# Patient Record
Sex: FEMALE | Race: BLACK OR AFRICAN AMERICAN | NOT HISPANIC OR LATINO | Employment: PART TIME | URBAN - METROPOLITAN AREA
[De-identification: names, ages, dates, MRNs, and addresses within clinical notes are randomized per-mention and may not be internally consistent; named-entity substitution may affect disease eponyms.]

---

## 2019-03-28 ENCOUNTER — HOSPITAL ENCOUNTER (EMERGENCY)
Facility: CLINIC | Age: 29
Discharge: HOME | End: 2019-03-28
Admitting: PHYSICIAN ASSISTANT

## 2019-03-28 ENCOUNTER — APPOINTMENT (OUTPATIENT)
Dept: GENERAL RADIOLOGY | Facility: CLINIC | Age: 29
End: 2019-03-28
Attending: PHYSICIAN ASSISTANT

## 2019-03-28 VITALS
HEIGHT: 65 IN | SYSTOLIC BLOOD PRESSURE: 140 MMHG | OXYGEN SATURATION: 100 % | BODY MASS INDEX: 37.75 KG/M2 | DIASTOLIC BLOOD PRESSURE: 74 MMHG | RESPIRATION RATE: 16 BRPM | TEMPERATURE: 99.2 F | WEIGHT: 226.6 LBS

## 2019-03-28 DIAGNOSIS — R05.9 COUGH: ICD-10-CM

## 2019-03-28 DIAGNOSIS — J45.901 EXACERBATION OF ASTHMA, UNSPECIFIED ASTHMA SEVERITY, UNSPECIFIED WHETHER PERSISTENT: ICD-10-CM

## 2019-03-28 DIAGNOSIS — R06.02 SHORTNESS OF BREATH: ICD-10-CM

## 2019-03-28 DIAGNOSIS — R50.9 FEVER: ICD-10-CM

## 2019-03-28 LAB
ANION GAP SERPL CALCULATED.3IONS-SCNC: 9 MMOL/L (ref 3–14)
BASOPHILS # BLD AUTO: 0 10E9/L (ref 0–0.2)
BASOPHILS NFR BLD AUTO: 0.2 %
BUN SERPL-MCNC: 9 MG/DL (ref 7–30)
CALCIUM SERPL-MCNC: 8.7 MG/DL (ref 8.5–10.1)
CHLORIDE SERPL-SCNC: 105 MMOL/L (ref 94–109)
CO2 SERPL-SCNC: 26 MMOL/L (ref 20–32)
CREAT SERPL-MCNC: 1 MG/DL (ref 0.52–1.04)
D DIMER PPP FEU-MCNC: <0.3 UG/ML FEU (ref 0–0.5)
DIFFERENTIAL METHOD BLD: ABNORMAL
EOSINOPHIL # BLD AUTO: 0.2 10E9/L (ref 0–0.7)
EOSINOPHIL NFR BLD AUTO: 3.3 %
ERYTHROCYTE [DISTWIDTH] IN BLOOD BY AUTOMATED COUNT: 14.1 % (ref 10–15)
GFR SERPL CREATININE-BSD FRML MDRD: 76 ML/MIN/{1.73_M2}
GLUCOSE SERPL-MCNC: 106 MG/DL (ref 70–99)
HCT VFR BLD AUTO: 34.5 % (ref 35–47)
HGB BLD-MCNC: 11 G/DL (ref 11.7–15.7)
IMM GRANULOCYTES # BLD: 0 10E9/L (ref 0–0.4)
IMM GRANULOCYTES NFR BLD: 0.4 %
LYMPHOCYTES # BLD AUTO: 1.5 10E9/L (ref 0.8–5.3)
LYMPHOCYTES NFR BLD AUTO: 27.2 %
MCH RBC QN AUTO: 26.4 PG (ref 26.5–33)
MCHC RBC AUTO-ENTMCNC: 31.9 G/DL (ref 31.5–36.5)
MCV RBC AUTO: 83 FL (ref 78–100)
MONOCYTES # BLD AUTO: 0.6 10E9/L (ref 0–1.3)
MONOCYTES NFR BLD AUTO: 11.5 %
NEUTROPHILS # BLD AUTO: 3.1 10E9/L (ref 1.6–8.3)
NEUTROPHILS NFR BLD AUTO: 57.4 %
NRBC # BLD AUTO: 0 10*3/UL
NRBC BLD AUTO-RTO: 0 /100
PLATELET # BLD AUTO: 315 10E9/L (ref 150–450)
POTASSIUM SERPL-SCNC: 3.5 MMOL/L (ref 3.4–5.3)
RBC # BLD AUTO: 4.17 10E12/L (ref 3.8–5.2)
SODIUM SERPL-SCNC: 140 MMOL/L (ref 133–144)
TROPONIN I SERPL-MCNC: <0.015 UG/L (ref 0–0.04)
WBC # BLD AUTO: 5.5 10E9/L (ref 4–11)

## 2019-03-28 PROCEDURE — 84484 ASSAY OF TROPONIN QUANT: CPT | Performed by: PHYSICIAN ASSISTANT

## 2019-03-28 PROCEDURE — 85025 COMPLETE CBC W/AUTO DIFF WBC: CPT | Performed by: PHYSICIAN ASSISTANT

## 2019-03-28 PROCEDURE — 93005 ELECTROCARDIOGRAM TRACING: CPT

## 2019-03-28 PROCEDURE — 80048 BASIC METABOLIC PNL TOTAL CA: CPT | Performed by: PHYSICIAN ASSISTANT

## 2019-03-28 PROCEDURE — 94640 AIRWAY INHALATION TREATMENT: CPT

## 2019-03-28 PROCEDURE — 25000125 ZZHC RX 250: Performed by: PHYSICIAN ASSISTANT

## 2019-03-28 PROCEDURE — 85379 FIBRIN DEGRADATION QUANT: CPT | Performed by: PHYSICIAN ASSISTANT

## 2019-03-28 PROCEDURE — 71046 X-RAY EXAM CHEST 2 VIEWS: CPT

## 2019-03-28 PROCEDURE — 99285 EMERGENCY DEPT VISIT HI MDM: CPT | Mod: 25

## 2019-03-28 RX ORDER — IPRATROPIUM BROMIDE AND ALBUTEROL SULFATE 2.5; .5 MG/3ML; MG/3ML
3 SOLUTION RESPIRATORY (INHALATION) ONCE
Status: COMPLETED | OUTPATIENT
Start: 2019-03-28 | End: 2019-03-28

## 2019-03-28 RX ORDER — PREDNISONE 20 MG/1
40 TABLET ORAL DAILY
Qty: 6 TABLET | Refills: 0 | Status: SHIPPED | OUTPATIENT
Start: 2019-03-28 | End: 2019-03-31

## 2019-03-28 RX ORDER — ALBUTEROL SULFATE 5 MG/ML
5 SOLUTION RESPIRATORY (INHALATION) EVERY 6 HOURS PRN
COMMUNITY
End: 2022-03-12

## 2019-03-28 RX ADMIN — IPRATROPIUM BROMIDE AND ALBUTEROL SULFATE 3 ML: .5; 2.5 SOLUTION RESPIRATORY (INHALATION) at 18:39

## 2019-03-28 ASSESSMENT — ENCOUNTER SYMPTOMS
RHINORRHEA: 0
FEVER: 1
SHORTNESS OF BREATH: 1
SORE THROAT: 1
COUGH: 1
WHEEZING: 1

## 2019-03-28 ASSESSMENT — MIFFLIN-ST. JEOR: SCORE: 1758.73

## 2019-03-28 NOTE — ED AVS SNAPSHOT
Emergency Department  [address] [place] [address]  Phone:  [phone]  Fax:  [phone]                                    Megha Salgado   MRN: [id number]    Department:   Emergency Department   Date of Visit:  3/28/2019           After Visit Summary Signature Page    I have received my discharge instructions, and my questions have been answered. I have discussed any challenges I see with this plan with the nurse or doctor.    ..........................................................................................................................................  Patient/Patient Representative Signature      ..........................................................................................................................................  Patient Representative Print Name and Relationship to Patient    ..................................................               ................................................  Date                                   Time    ..........................................................................................................................................  Reviewed by Signature/Title    ...................................................              ..............................................  Date                                               Time          22EPIC Rev 08/18

## 2019-03-28 NOTE — ED PROVIDER NOTES
History     Chief Complaint:  Shortness of breath     HPI   Megha Salgado is a 28 year old female with a history of asthma, who presents with her mom to the ED for the evaluation of shortness of breath. The patient reports that for the past two days she has been experiencing shortness of breath, prompting her to the ED. She describes that her breathing feels tight and wheezy. She notes that she is also experiencing a dry cough and sore throat and that yesterday she had a fever. She also notes that she used two albuterol nebulization just before her arrival to the ED and that she had the respiratory flu last week. She states that she was admitted to a hospital in 2004 for an asthma exasperation and that she returned home from a flight from [place] one week ago. The patient denies chest pain, rhinorrhea, and swelling in her legs.    PE/DVT RISK FACTORS:  Sex:    Female  Hormones:   No  Tobacco:   No  Cancer:   No  Travel:   Yes  Surgery:   No  Other immobilization: No  Personal history:  No  Family history:  No     Allergies:  No known drug allergies     Medications:    The patient is not currently taking any prescribed medications.    Past Medical History:    Asthma     Past Surgical History:    History reviewed. No pertinent surgical history.    Family History:    History reviewed. No pertinent family history.     Social History:  Smoking status: Never Smoker  Alcohol use: No   Marital Status:  Single [1]     Review of Systems   Constitutional: Positive for fever.   HENT: Positive for sore throat. Negative for rhinorrhea.    Respiratory: Positive for cough, shortness of breath and wheezing.    Cardiovascular: Negative for chest pain and leg swelling.   All other systems reviewed and are negative.    Physical Exam     Patient Vitals for the past 24 hrs:   BP Temp Temp src Heart Rate Resp SpO2 Height Weight   03/28/19  1821    88       03/28/19 1800 140/74 -- -- -- -- 100 % -- --   03/28/19 1751 139/79 99.2  " F (37.3  C) Oral 109 16 100 % 1.651 m (5' 5\") 102.8 kg (226 lb 9.6 oz)      Physical Exam  General: Well appearing, well nourished. Normal mood and affect.  Skin: Good turgor, no rash, no unusual bruising or prominent lesions.  HEENT: Head: Normocephalic, atraumatic, no visible masses.   Eyes: Conjunctiva clear.  Ears: EACs clear, TMs translucent.   Nose: Mild nasal congestion. Clear rhinorrhea.   Throat/pharynx: Mucous membranes moist, no mucosal lesions.   Neck: Supple, without lymphadenopathy.  Cardiac: Tachycardic rate and regular rhythm, no murmur or gallop.   Lungs: Mild wheezing to the left base.   Abdomen: Abdomen soft, non-tender. No rebound tenderness of guarding. Large habitus.   Musculoskeletal: Normal gait and station. No calf tenderness or swelling.   Neurologic: Oriented x 3. GCS: 15.  Psychiatric: Intact recent and remote memory, judgment and insight, normal mood and affect.      Emergency Department Course   ECG (18:31:53):  Rate 100 bpm. CO interval 158. QRS duration 80. QT/QTc 356/459. P-R-T axes 68 77 55. Normal sinus rhythm. Normal ECG. Interpreted at 1830 by Kinza Snyder MD.     Imaging:  Radiographic findings were communicated with the patient who voiced understanding of the findings.  XR Chest 2 Views  FINDINGS: Lungs clear. No confluent airspace opacity, pleural effusion  or pneumothorax. Normal heart size and pulmonary vascularity. The  osseous structures are unremarkable.  As read by Radiology.     Laboratory:  D dimer quantitative (1823): <0.3  CBC: HGB 11.0 (L), WNL (WBC 5.5, )  BMP: Glucose 106 (H), WNL (Creatinine 1.00)  Troponin (1823): <0.015    Interventions:  1839, Duoneb, 3 mLs, Nebulization    Emergency Department Course:  Past medical records, nursing notes, and vitals reviewed.  1800: I performed an exam of the patient and obtained history, as documented above.    IV inserted and blood drawn.    1905: I rechecked the patient. Explained findings to patient and " mother.    The patient was sent for a chest x ray while in the emergency department, findings above.    1940: I rechecked the patient.  Findings and plan explained to the Patient. Patient discharged home with instructions regarding supportive care, medications, and reasons to return. The importance of close follow-up was reviewed.       Impression & Plan    Medical Decision Making:  Megha Salgado is a 28 year old female with a PMH of asthma who presents for evaluation of shortness of breath and wheezing. Details of the patient's history can be noted in the HPI. Wheezing present on exam, signs and symptoms are consistent with asthma exacerbation.  A broad differential was considered including foreign body, asthma, pneumonia, bronchitis, reactive airway disease, pneumothorax, cardiac equivalent, viral induced wheezing, allergic phenomena, pertussis, etc. due to the patient's recent travel and tachycardia, she was not PERC negative.  D-dimer was completed.  This returned within normal limits.  No other laboratory abnormalities.  ECG within normal limits.  Chest x-ray was obtained to look further for signs of pneumonia given her difficulty breathing, fevers, cough.  This returned within normal limits.  Duo nebulizer was given to her here.  She felt significant improvement in her breathing after this treatment. No indication for hospitalization at this time as the patient has no signs of hypoxia, no marked increase in respiratory rate, no significant retractions. Patient was discharged with oral steroids. She will continue to use her nebulizer treatments as prescribed at home.  They will follow-up with her primary care provider in the next 2-3 days.  They will return if increased wheezing, progressive shortness of breath, fever > 102, new concerns.  All questions were answered prior to the patient's discharge. They are in agreement with the plan stated above.     Diagnosis:    ICD-10-CM    1. Exacerbation of  asthma, unspecified asthma severity, unspecified whether persistent J45.901 Troponin I   2. Cough R05    3. Fever R50.9    4. Shortness of breath R06.02        Disposition:  discharged to home    Discharge Medications:     Medication List      Started    predniSONE 20 MG tablet  Commonly known as:  DELTASONE  40 mg, Oral, DAILY          Scribe Disclosure:  I, Jay Reinaldo, am serving as a scribe at 5:55 PM on 3/28/2019 to document services personally performed by Eugenia Flanagan PA-C based on my observations and the provider's statements to me.      Jay Najera  3/28/2019    EMERGENCY DEPARTMENT    This was created at least in part with a voice recognition software. Mistakes/typos may be present.      Eugenia Flanagan PA  03/28/19 2120

## 2019-03-29 LAB — INTERPRETATION ECG - MUSE: NORMAL

## 2019-03-29 NOTE — DISCHARGE INSTRUCTIONS
Use your nebulizers as needed.  Take the steroid for the next 3 days.  Follow-up with the clinic listed above next week for recheck.  Return for any changing or worsening symptoms.      Discharge Instructions  Asthma    Asthma is a condition causing narrowing and inflammation of the airways that can make it hard to breathe.  Asthma can also cause cough, wheezing, noisy breathing and tightness in the chest.  Asthma can be brought on or ?triggered? by many things, including dust, mold, pollen, cigarette smoke, exercise, stress and infections, like the common cold.     Return to the Emergency Department if:  Your breathing gets worse.  You need to use your inhaler more often than every 4 hours, or can?t get relief from your inhaler.  You are very weak, or feel much more ill.  You develop new symptoms, such as chest pain.  You cough up blood.  You are vomiting enough that you can?t keep fluids or your medicine down.    What can I do to help myself?  Fill any prescriptions the doctor gave you and take them right away--especially antibiotics. Be sure to finish the whole antibiotic prescription.  You may be given a prescription for an inhaler, which can help loosen tight air passages.  Use this as needed, but not more often than directed. Inhalers work much better when used with a spacer.   You may be given a prescription for a steroid to reduce inflammation. Used long-term, these can have many serious side effects, but for short courses these do not happen. You may notice restlessness or increased appetite.      You may use non-prescription cough or cold medicines. Cough medicines may help, but don?t make the cough go away completely.   Avoid smoke, because this can make your symptoms worse. If you smoke, this may be a good time to quit! Consider using nicotine lozenges, gum, or patches to reduce cravings.   If you have a fever, Tylenol  (acetaminophen), Motrin  (ibuprofen), or Advil  (ibuprofen), may help bring fever  down and may help you feel more comfortable. Be sure to read and follow the package directions, and ask your doctor if you have questions.  Be sure to get your flu shot each year.  The pneumonia shot can help prevent pneumonia.  It is important that you follow up with your regular doctor, to be sure that you are improving from this spell (an acute asthma exacerbation), and also to do what you can to keep from having trouble again. Sometimes you need long-term medicines to keep your asthma under control.   If you were given a prescription for medicine here today, be sure to read all of the information (including the package insert) that comes with your prescription.  This will include important information about the medicine, its side effects, and any warnings that you need to know about.  The pharmacist who fills the prescription can provide more information and answer questions you may have about the medicine.  If you have questions or concerns that the pharmacist cannot address, please call or return to the Emergency Department.   Opioid Medication Information    Pain medications are among the most commonly prescribed medicines, so we are including this information for all our patients. If you did not receive pain medication or get a prescription for pain medicine, you can ignore it.     You may have been given a prescription for an opioid (narcotic) pain medicine and/or have received a pain medicine while here in the Emergency Department. These medicines can make you drowsy or impaired. You must not drive, operate dangerous equipment, or engage in any other dangerous activities while taking these medications. If you drive while taking these medications, you could be arrested for DUI, or driving under the influence. Do not drink any alcohol while you are taking these medications.     Opioid pain medications can cause addiction. If you have a history of chemical dependency of any type, you are at a higher risk of  becoming addicted to pain medications.  Only take these prescribed medications to treat your pain when all other options have been tried. Take it for as short a time and as few doses as possible. Store your pain pills in a secure place, as they are frequently stolen and provide a dangerous opportunity for children or visitors in your house to start abusing these powerful medications. We will not replace any lost or stolen medicine.  As soon as your pain is better, you should flush all your remaining medication.     Many prescription pain medications contain Tylenol  (acetaminophen), including Vicodin , Tylenol #3 , Norco , Lortab , and Percocet .  You should not take any extra pills of Tylenol  if you are using these prescription medications or you can get very sick.  Do not ever take more than 3000 mg of acetaminophen in any 24 hour period.    All opioids tend to cause constipation. Drink plenty of water and eat foods that have a lot of fiber, such as fruits, vegetables, prune juice, apple juice and high fiber cereal.  Take a laxative if you don?t move your bowels at least every other day. Miralax , Milk of Magnesia, Colace , or Senna  can be used to keep you regular.      Remember that you can always come back to the Emergency Department if you are not able to see your regular doctor in the amount of time listed above, if you get any new symptoms, or if there is anything that worries you.

## 2019-05-04 ENCOUNTER — HOSPITAL ENCOUNTER (EMERGENCY)
Facility: CLINIC | Age: 29
Discharge: HOME | End: 2019-05-04
Attending: EMERGENCY MEDICINE | Admitting: EMERGENCY MEDICINE
Payer: COMMERCIAL

## 2019-05-04 VITALS
HEART RATE: 90 BPM | DIASTOLIC BLOOD PRESSURE: 68 MMHG | SYSTOLIC BLOOD PRESSURE: 135 MMHG | OXYGEN SATURATION: 98 % | TEMPERATURE: 99 F | WEIGHT: 220 LBS | RESPIRATION RATE: 16 BRPM | HEIGHT: 66 IN | BODY MASS INDEX: 35.36 KG/M2

## 2019-05-04 DIAGNOSIS — S20.211A CHEST WALL CONTUSION, RIGHT, INITIAL ENCOUNTER: ICD-10-CM
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
## 2019-05-04 PROCEDURE — 25000132 ZZH RX MED GY IP 250 OP 250 PS 637: Performed by: EMERGENCY MEDICINE

## 2019-05-04 PROCEDURE — 99283 EMERGENCY DEPT VISIT LOW MDM: CPT

## 2019-05-04 RX ORDER — IBUPROFEN 600 MG/1
600 TABLET, FILM COATED ORAL ONCE
Status: COMPLETED | OUTPATIENT
Start: 2019-05-04 | End: 2019-05-04

## 2019-05-04 RX ADMIN — IBUPROFEN 600 MG: 600 TABLET ORAL at 15:22

## 2019-05-04 ASSESSMENT — ENCOUNTER SYMPTOMS
HEADACHES: 1
NUMBNESS: 0
SHORTNESS OF BREATH: 0
ABDOMINAL PAIN: 0
BACK PAIN: 0
WEAKNESS: 0
NECK PAIN: 0

## 2019-05-04 ASSESSMENT — MIFFLIN-ST. JEOR: SCORE: 1739.66
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
## 2019-05-04 NOTE — ED NOTES
Patient up to desk inquiring if a room is available for her. Patient updated on delay. Patient went back to visit mom in ED.

## 2019-05-04 NOTE — ED AVS SNAPSHOT
Emergency Department  [address] [place] [address]  Phone:  [phone]  Fax:  [phone]                                    Megha Salgado   MRN: [id number]    Department:   Emergency Department   Date of Visit:  5/4/2019           After Visit Summary Signature Page    I have received my discharge instructions, and my questions have been answered. I have discussed any challenges I see with this plan with the nurse or doctor.    ..........................................................................................................................................  Patient/Patient Representative Signature      ..........................................................................................................................................  Patient Representative Print Name and Relationship to Patient    ..................................................               ................................................  Date                                   Time    ..........................................................................................................................................  Reviewed by Signature/Title    ...................................................              ..............................................  Date                                               Time          22EPIC Rev 08/18

## 2020-12-01 ENCOUNTER — HOSPITAL ENCOUNTER (EMERGENCY)
Facility: CLINIC | Age: 30
Discharge: HOME | End: 2020-12-01
Attending: NURSE PRACTITIONER | Admitting: NURSE PRACTITIONER
Payer: COMMERCIAL

## 2020-12-01 VITALS
SYSTOLIC BLOOD PRESSURE: 128 MMHG | WEIGHT: 220 LBS | BODY MASS INDEX: 36.65 KG/M2 | TEMPERATURE: 98.9 F | HEIGHT: 65 IN | OXYGEN SATURATION: 100 % | RESPIRATION RATE: 18 BRPM | DIASTOLIC BLOOD PRESSURE: 63 MMHG | HEART RATE: 85 BPM

## 2020-12-01 DIAGNOSIS — M54.10 RADICULAR LOW BACK PAIN: ICD-10-CM

## 2020-12-01 DIAGNOSIS — M62.830 BACK MUSCLE SPASM: ICD-10-CM

## 2020-12-01 PROCEDURE — 99282 EMERGENCY DEPT VISIT SF MDM: CPT

## 2020-12-01 RX ORDER — METHOCARBAMOL 500 MG/1
TABLET, FILM COATED ORAL
Qty: 24 TABLET | Refills: 0 | Status: SHIPPED | OUTPATIENT
Start: 2020-12-01

## 2020-12-01 RX ORDER — METHOCARBAMOL 500 MG/1
TABLET, FILM COATED ORAL
Qty: 24 TABLET | Refills: 0 | Status: SHIPPED | OUTPATIENT
Start: 2020-12-01 | End: 2020-12-01

## 2020-12-01 ASSESSMENT — ENCOUNTER SYMPTOMS
WEAKNESS: 1
BACK PAIN: 1

## 2020-12-01 ASSESSMENT — MIFFLIN-ST. JEOR: SCORE: 1718.79

## 2020-12-01 NOTE — ED PROVIDER NOTES
Emergency Department Attending Supervision Note  12/1/2020  2:27 PM      I evaluated this patient in conjunction with Megha WESLEY    Briefly, the patient presented with       On my exam:  Nursing notes reviewed. Vitals reviewed.   General: Alert.  No obvious discomfort . Well kept. Obese  HENT: Normal voice.   Neck: non-tender. Full ROM, no bony deformity, step-off, or crepitus. No obvious paracervical muscle spasm.  Eyes: Sclera and conjunctiva normal  Pulmonary: Normal respiratory effort. No cough.    Musculoskeletal: Normal gross range of motion of all 4 extremities. No spinal tenderness, deformity, step-off, or crepitus. Mild  bilateral  paravertebral muscle spasm, Normal leg strength against resistance bilaterally without triggering discomfort  Neurological: Alert. Normal speech. Responds appropriately. Normal sensation and strength distally.  Skin: Warm and dry. Normal appearance of visualized exposed skin.  Psych: Affect normal. Normal personal interaction. Good eye contact.      Diagnosis    ICD-10-CM    1. Radicular low back pain  M54.10    2. Back muscle spasm  M62.830          Nicola Esparza APRN CNP 12/1/2020 2:27 PM     Nicola Esparza APRN CNP  12/01/20 1503
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
## 2020-12-01 NOTE — ED AVS SNAPSHOT
[facility]  [address] [facility] [address] [facility] [address]  Phone: [phone]  Fax: [phone]                                    Megha Salgado   MRN: [id number]    Department: [facility]   Date of Visit: 12/1/2020           After Visit Summary Signature Page    I have received my discharge instructions, and my questions have been answered. I have discussed any challenges I see with this plan with the nurse or doctor.    ..........................................................................................................................................  Patient/Patient Representative Signature      ..........................................................................................................................................  Patient Representative Print Name and Relationship to Patient    ..................................................               ................................................  Date                                   Time    ..........................................................................................................................................  Reviewed by Signature/Title    ...................................................              ..............................................  Date                                               Time          22EPIC Rev 08/18

## 2021-03-26 ENCOUNTER — OFFICE VISIT (OUTPATIENT)
Dept: URGENT CARE | Facility: URGENT CARE | Age: 31
End: 2021-03-26
Payer: COMMERCIAL

## 2021-03-26 VITALS
HEART RATE: 65 BPM | TEMPERATURE: 99.2 F | SYSTOLIC BLOOD PRESSURE: 130 MMHG | WEIGHT: 217.2 LBS | RESPIRATION RATE: 20 BRPM | OXYGEN SATURATION: 100 % | BODY MASS INDEX: 36.14 KG/M2 | DIASTOLIC BLOOD PRESSURE: 88 MMHG

## 2021-03-26 DIAGNOSIS — N93.9 VAGINAL BLEEDING PROBLEMS: ICD-10-CM

## 2021-03-26 DIAGNOSIS — N76.0 BACTERIAL VAGINOSIS: Primary | ICD-10-CM

## 2021-03-26 DIAGNOSIS — R30.0 DYSURIA: ICD-10-CM

## 2021-03-26 DIAGNOSIS — B96.89 BACTERIAL VAGINOSIS: Primary | ICD-10-CM

## 2021-03-26 LAB
ALBUMIN UR-MCNC: NEGATIVE MG/DL
APPEARANCE UR: CLEAR
BACTERIA #/AREA URNS HPF: ABNORMAL /HPF
BILIRUB UR QL STRIP: NEGATIVE
COLOR UR AUTO: YELLOW
GLUCOSE UR STRIP-MCNC: NEGATIVE MG/DL
HGB UR QL STRIP: ABNORMAL
KETONES UR STRIP-MCNC: NEGATIVE MG/DL
LEUKOCYTE ESTERASE UR QL STRIP: NEGATIVE
NITRATE UR QL: NEGATIVE
NON-SQ EPI CELLS #/AREA URNS LPF: ABNORMAL /LPF
PH UR STRIP: 6.5 PH (ref 5–7)
RBC #/AREA URNS AUTO: ABNORMAL /HPF
SOURCE: ABNORMAL
SP GR UR STRIP: 1.01 (ref 1–1.03)
SPECIMEN SOURCE: ABNORMAL
UROBILINOGEN UR STRIP-ACNC: 0.2 EU/DL (ref 0.2–1)
WBC #/AREA URNS AUTO: ABNORMAL /HPF
WET PREP SPEC: ABNORMAL

## 2021-03-26 PROCEDURE — 99203 OFFICE O/P NEW LOW 30 MIN: CPT | Performed by: PHYSICIAN ASSISTANT

## 2021-03-26 PROCEDURE — 81001 URINALYSIS AUTO W/SCOPE: CPT | Performed by: PHYSICIAN ASSISTANT

## 2021-03-26 PROCEDURE — 87591 N.GONORRHOEAE DNA AMP PROB: CPT | Performed by: PHYSICIAN ASSISTANT

## 2021-03-26 PROCEDURE — 87491 CHLMYD TRACH DNA AMP PROBE: CPT | Performed by: PHYSICIAN ASSISTANT

## 2021-03-26 PROCEDURE — 87086 URINE CULTURE/COLONY COUNT: CPT | Performed by: PHYSICIAN ASSISTANT

## 2021-03-26 PROCEDURE — 87210 SMEAR WET MOUNT SALINE/INK: CPT | Performed by: PHYSICIAN ASSISTANT

## 2021-03-26 RX ORDER — METRONIDAZOLE 500 MG/1
500 TABLET ORAL 2 TIMES DAILY
Qty: 14 TABLET | Refills: 0 | Status: SHIPPED | OUTPATIENT
Start: 2021-03-26 | End: 2021-04-02

## 2021-03-26 RX ORDER — TOPIRAMATE 25 MG/1
TABLET, FILM COATED ORAL
COMMUNITY
Start: 2020-10-17 | End: 2022-03-12

## 2021-03-27 NOTE — PROGRESS NOTES
Patient presents with:  Vaginal Problem: Vaginal bleeding  x 1 week, vaginal burning sensation with urination x for a day about 1 week ago, sharp pain radiating from vaginal area to abdominal area on Lt  side on and off x about 1 week.     (N76.0,  B96.89) Bacterial vaginosis  (primary encounter diagnosis)  Comment:   Plan: metroNIDAZOLE (FLAGYL) 500 MG tablet            (R30.0) Dysuria  Comment:   Plan: UA with Microscopic reflex to Culture, Urine         Culture Aerobic Bacterial, NEISSERIA GONORRHOEA        PCR, CHLAMYDIA TRACHOMATIS PCR            (N93.9) Vaginal bleeding problems  Comment:   Plan: Wet prep, NEISSERIA GONORRHOEA PCR, CHLAMYDIA         TRACHOMATIS PCR            Follow up should symptoms persist or worsen.      Urine culture, GC and CHL labs pending.            SUBJECTIVE:   Megha Salgado is a 30 year old female who presents today with vaginal discharge, with small amount of bleeding, and dysuria, onset one week ago.     Sexually active with stable partner.    Some left sided lower abdominal pain.      LMP was 2 weeks ago.           Current Outpatient Medications   Medication Sig Dispense Refill     Multiple Vitamins-Iron (DAILY-ALEXIS/IRON/BETA-CAROTENE) TABS TAKE 1 TABLET BY MOUTH DAILY. (Patient not taking: Reported on 10/19/2020) 30 tablet 7     Social History     Tobacco Use     Smoking status: Never Smoker     Smokeless tobacco: Never Used   Substance Use Topics     Alcohol use: Not on file     Family History   Problem Relation Age of Onset     Diabetes Mother      Diabetes Father          ROS:    10 point ROS of systems including Constitutional, Eyes, Respiratory, Cardiovascular, Gastroenterology, Genitourinary, Integumentary, Muscularskeletal, Psychiatric ,neurological were all negative except for pertinent positives noted in my HPI       OBJECTIVE:  /88   Pulse 65   Temp 99.2  F (37.3  C) (Tympanic)   Resp 20   Wt 98.5 kg (217 lb 3.2 oz)   SpO2 100%   BMI 36.14 kg/m    Physical  Exam:  GENERAL APPEARANCE: healthy, alert and no distress  ABDOMEN:  soft, nontender, no HSM or masses and bowel sounds normal  GU_female: deferred, self wet prep obtained.     SKIN: no suspicious lesions or rashes    Results for orders placed or performed in visit on 03/26/21   UA with Microscopic reflex to Culture     Status: Abnormal (In process)    Specimen: Midstream Urine   Result Value Ref Range    Color Urine Yellow     Appearance Urine Clear     Glucose Urine Negative NEG^Negative mg/dL    Bilirubin Urine Negative NEG^Negative    Ketones Urine Negative NEG^Negative mg/dL    Specific Gravity Urine 1.010 1.003 - 1.035    pH Urine 6.5 5.0 - 7.0 pH    Protein Albumin Urine Negative NEG^Negative mg/dL    Urobilinogen Urine 0.2 0.2 - 1.0 EU/dL    Nitrite Urine Negative NEG^Negative    Blood Urine Small (A) NEG^Negative    Leukocyte Esterase Urine Negative NEG^Negative    Source Midstream Urine     WBC Urine PENDING OTO5^0 - 5 /HPF    RBC Urine 2-5 (A) OTO2^O - 2 /HPF    Squamous Epithelial /LPF Urine Few FEW^Few /LPF    Bacteria Urine Few (A) NEG^Negative /HPF   Wet prep     Status: Abnormal    Specimen: Vagina   Result Value Ref Range    Specimen Description Vagina     Wet Prep No Trichomonas seen     Wet Prep Moderate  Clue cells seen   (A)     Wet Prep No yeast seen     Wet Prep Few  WBC'S seen

## 2021-03-27 NOTE — PATIENT INSTRUCTIONS
Patient Education     Bacterial Vaginosis    You have a vaginal infection called bacterial vaginosis (BV). Both good and bad bacteria are present in a healthy vagina. BV occurs when these bacteria get out of balance. The number of bad bacteria increase. And the number of good bacteria decrease. BV is linked with sexual activity, but it's not a sexually transmitted infection (STI).   BV may or may not cause symptoms. If symptoms do occur, they can include:     Thin, gray, milky-white, or sometimes green discharge    Unpleasant odor or  fishy  smell    Itching, burning, or pain in or around the vagina  It is not known what causes BV, but certain factors can make the problem more likely. These can include:     Douching    Spermicides    Use of antibiotics    Change in hormone levels with pregnancy, breastfeeding, or menopause    Having sex with a new partner    Having sex with more than one partner  BV will sometimes go away on its own. But treatment is often advised. This is because untreated BV can raise the risk of more serious health problems such as:     Pelvic inflammatory disease (PID)     delivery (giving birth to a baby early if you re pregnant)    HIV and some other sexually transmitted infections (STIs)    Infection after surgery on the reproductive organs  Home care  General care    BV is most often treated with medicines called antibiotics. These may be given as pills or as a vaginal cream. If antibiotics are prescribed, be sure to use them exactly as directed. And complete all of the medicine, even if your symptoms go away.    Don't douche or having sex during treatment.    If you have sex with a female partner, ask your healthcare provider if she should also be treated.  Prevention    Don't douche.    Don't have sex. If you do have sex, then take steps to lower your risk:  ? Use condoms when having sex.  ? Limit the number of sex partners you have.    Follow-up care  Follow up with your  healthcare provider, or as advised.   When to get medical advice  Call your healthcare provider right away if:     You have a fever of 100.4 F (38 C) or higher, or as directed by your provider.    Your symptoms get worse, or they don t go away within a few days of starting treatment.    You have new pain in the lower belly or pelvic region.    You have side effects that bother you or a reaction to the pills or cream you re prescribed.    You or any of your sex partners have new symptoms, such as a rash, joint pain, or sores.  [employer] last reviewed this educational content on 6/1/2020 2000-2020 [employer]. All rights reserved. This information is not intended as a substitute for professional medical care. Always follow your healthcare professional's instructions.

## 2021-03-28 LAB
BACTERIA SPEC CULT: NO GROWTH
C TRACH DNA SPEC QL NAA+PROBE: NEGATIVE
Lab: NORMAL
N GONORRHOEA DNA SPEC QL NAA+PROBE: NEGATIVE
SPECIMEN SOURCE: NORMAL

## 2022-03-12 ENCOUNTER — OFFICE VISIT (OUTPATIENT)
Dept: URGENT CARE | Facility: URGENT CARE | Age: 32
End: 2022-03-12
Payer: COMMERCIAL

## 2022-03-12 VITALS
OXYGEN SATURATION: 100 % | HEART RATE: 101 BPM | RESPIRATION RATE: 20 BRPM | WEIGHT: 217 LBS | DIASTOLIC BLOOD PRESSURE: 82 MMHG | TEMPERATURE: 99.9 F | BODY MASS INDEX: 36.11 KG/M2 | SYSTOLIC BLOOD PRESSURE: 124 MMHG

## 2022-03-12 DIAGNOSIS — Z20.822 SUSPECTED COVID-19 VIRUS INFECTION: Primary | ICD-10-CM

## 2022-03-12 DIAGNOSIS — J06.9 VIRAL URI WITH COUGH: ICD-10-CM

## 2022-03-12 DIAGNOSIS — E66.01 MORBID OBESITY (H): ICD-10-CM

## 2022-03-12 DIAGNOSIS — J45.21 MILD INTERMITTENT ASTHMA WITH EXACERBATION: ICD-10-CM

## 2022-03-12 PROBLEM — J45.990 ASTHMA, EXERCISE INDUCED: Status: ACTIVE | Noted: 2020-07-23

## 2022-03-12 PROBLEM — R87.610 ATYPICAL SQUAMOUS CELLS OF UNDETERMINED SIGNIFICANCE ON CYTOLOGIC SMEAR OF CERVIX (ASC-US): Status: ACTIVE | Noted: 2020-08-06

## 2022-03-12 PROBLEM — R73.03 PREDIABETES: Status: ACTIVE | Noted: 2020-07-26

## 2022-03-12 PROBLEM — E66.812 OBESITY, CLASS II, BMI 35-39.9: Status: ACTIVE | Noted: 2020-07-23

## 2022-03-12 PROBLEM — G43.109 MIGRAINE WITH AURA AND WITHOUT STATUS MIGRAINOSUS, NOT INTRACTABLE: Status: ACTIVE | Noted: 2020-07-23

## 2022-03-12 LAB — SARS-COV-2 RNA RESP QL NAA+PROBE: NEGATIVE

## 2022-03-12 PROCEDURE — 99214 OFFICE O/P EST MOD 30 MIN: CPT | Mod: CS | Performed by: NURSE PRACTITIONER

## 2022-03-12 PROCEDURE — U0005 INFEC AGEN DETEC AMPLI PROBE: HCPCS | Performed by: NURSE PRACTITIONER

## 2022-03-12 PROCEDURE — U0003 INFECTIOUS AGENT DETECTION BY NUCLEIC ACID (DNA OR RNA); SEVERE ACUTE RESPIRATORY SYNDROME CORONAVIRUS 2 (SARS-COV-2) (CORONAVIRUS DISEASE [COVID-19]), AMPLIFIED PROBE TECHNIQUE, MAKING USE OF HIGH THROUGHPUT TECHNOLOGIES AS DESCRIBED BY CMS-2020-01-R: HCPCS | Performed by: NURSE PRACTITIONER

## 2022-03-12 RX ORDER — ALBUTEROL SULFATE 5 MG/ML
2.5 SOLUTION RESPIRATORY (INHALATION) EVERY 4 HOURS PRN
Qty: 360 ML | Refills: 0 | Status: SHIPPED | OUTPATIENT
Start: 2022-03-12 | End: 2022-04-11

## 2022-03-12 RX ORDER — TOPIRAMATE 100 MG/1
100 TABLET, FILM COATED ORAL 2 TIMES DAILY
COMMUNITY
Start: 2021-06-28

## 2022-03-12 RX ORDER — PREDNISONE 20 MG/1
40 TABLET ORAL DAILY
Qty: 10 TABLET | Refills: 0 | Status: SHIPPED | OUTPATIENT
Start: 2022-03-12 | End: 2022-03-17

## 2022-03-12 ASSESSMENT — ASTHMA QUESTIONNAIRES
ACT_TOTALSCORE: 12
ACUTE_EXACERBATION_TODAY: NO
QUESTION_1 LAST FOUR WEEKS HOW MUCH OF THE TIME DID YOUR ASTHMA KEEP YOU FROM GETTING AS MUCH DONE AT WORK, SCHOOL OR AT HOME: A LITTLE OF THE TIME
QUESTION_4 LAST FOUR WEEKS HOW OFTEN HAVE YOU USED YOUR RESCUE INHALER OR NEBULIZER MEDICATION (SUCH AS ALBUTEROL): THREE OR MORE TIMES PER DAY
QUESTION_3 LAST FOUR WEEKS HOW OFTEN DID YOUR ASTHMA SYMPTOMS (WHEEZING, COUGHING, SHORTNESS OF BREATH, CHEST TIGHTNESS OR PAIN) WAKE YOU UP AT NIGHT OR EARLIER THAN USUAL IN THE MORNING: ONCE OR TWICE
ACT_TOTALSCORE: 12
QUESTION_2 LAST FOUR WEEKS HOW OFTEN HAVE YOU HAD SHORTNESS OF BREATH: ONCE A DAY
QUESTION_5 LAST FOUR WEEKS HOW WOULD YOU RATE YOUR ASTHMA CONTROL: NOT CONTROLLED AT ALL

## 2022-03-12 NOTE — PATIENT INSTRUCTIONS
Patient Education     Asthma  What is asthma?  Asthma is a long-term (chronic) ?lung disease. The airways react to triggers (allergens and irritants). This makes it hard to breathe. With exposure to triggers, changes can occur such as:     The airways become swollen and inflamed.    The muscles around the airways tighten.    More mucus is made. This leads to mucus plugs.  All of these changes make the airways narrow. This makes it hard for air to go out of the lungs. And fresh oxygen can't get into the body.   What causes asthma?  Experts don't know the exact cause of asthma. They believe it is partly inherited. The environment, infections, and chemicals released by the body also play a role.   Exercise causes symptoms in many people with asthma. Symptoms can occur during exercise. They can also occur right after exercise. In some people, stress or strong feelings can cause symptoms.   All of these may be asthma triggers:   Allergens Respiratory problem         Pollens (trees, grasses, and weeds)    Mold    Pets    Dust and dust mites    Cockroaches    Mice       Nasal allergies    Sinus infections    The flu    Viral infections, including the common cold   Irritants Medicines         Strong odors from perfumes, , cooking, paints, and varnishes    Chemicals (gases, fumes)    Air pollution    Changing weather (temperature, barometric pressure, humidity, and strong winds)    Smoke (tobacco-inhaled or secondhand)       Aspirin    NSAIDs (nonsteroidal anti-inflammatory drugs) such as ibuprofen   Other conditions Other         GERD (gastroesophageal reflux)    Sleep apnea    Overweight    Depression       Exercise, especially in cold weather    Strong feelings that go along with laughing or crying       Who is at risk for asthma?  It is most common in:     Children and teens ages 5 to17    People living in cities  Other factors include:    Personal or family history of asthma or allergies    Exposure to  secondhand tobacco smoke    Children with a family history of asthma    Children who have allergies or atopic dermatitis    Children exposed to secondhand and tobacco smoke    Living in areas with high levels of environmental air pollution  What are the symptoms of asthma?  Symptoms include:    Trouble breathing or shortness of breath    Chest tightness    Wheezing or a whistling sound when breathing    Coughing    Breathing becomes harder and may hurt    Talking and sleeping may be harder with severe symptoms  How is asthma diagnosed?  Your healthcare provider will ask about your health history. They will give you a physical exam. You will also have other tests. An important test is spirometry.   A spirometer is a device used to find out how well the lungs are working. It measures the amount and speed of air breathed out.   You may have other tests. These are done to check for conditions such as allergies.   How is asthma treated?  Treatment will depend on your symptoms, age, and general health. It will also depend on how severe the condition is.   There is no cure for asthma. It can often be controlled by staying away from triggers. And by taking medicines as prescribed by your healthcare provider.   Watching for symptoms and taking early, appropriate action is a key part of asthma care. So is knowing what to do if symptoms get worse. Experts advise making an Asthma Action Plan with your provider and educating your family and close friends about its purpose and content. This will help them provide correct asthma care if you are ill and can't care for yourself.   Medicines for asthma  The 2 types of asthma medicines are long-term control and short-term (quick-relief) medicines. Long-term control medicines are often taken every day. They help prevent symptoms. Quick-relief medicines calm asthma symptoms fast. But they only last for a short time. You may take either type of medicine alone. Some people take both.    Your healthcare provider should regularly check and adjust your medicines as needed.   Long-term control medicines  At first, it may take a few weeks for long-term control medicines to work. You must take these medicines every day. These medicines include:     Anti-inflammatory medicines. These medicines reduce or prevent airway swelling.    Bronchodilators. These relax muscles around the airways.    Leukotriene modifiers. These block the action of chemicals called leukotrienes. These are chemicals that cause airways to be inflamed and narrowed.     Biologic therapy. For people with asthma that isn't well controlled despite inhaler therapy, there are some newer medicines. These target the inflammatory cells in the body that start the asthma reaction. These medicines include anti-IL4, Anti-IL5, and anti-IgE medicines. They are often given by shot (injection) or infusion.  Quick-relief medicines  Quick-relief medicines quickly relax the muscles around the airways. But the relief only lasts about 2 to 3 hours.   These medicines may include:    Inhaled short-acting beta2-agonists .  These help relax muscles around the airways.    Inhaled anticholinergics . These block a chemical in the body called acetylcholine. This chemical contracts the muscles. It also causes more mucus in the airways.  Inhalation devices for asthma  Inhaled medicines go right to the lungs. They have fewer side effects than medicines taken by mouth. Inhaled medicines may be anti-inflammatory or bronchodilating. Or they may be both. The devices used are:     Metered-dose inhaler (MDI). This is the most common type of inhaler. It uses a chemical to push the medicine out of the inhaler. MDIs are held in front of or put into the mouth. Then the medicine is released in puffs. Or they may be used with a spacer device.    Nebulizer. This device sprays a fine mist of medicine. This is done through a mask using air under pressure, or an ultrasonic machine.  A mouthpiece or mask is connected to a machine by plastic tubing to deliver medicine.    Dry powder or rotary inhaler.  These inhalers deliver powered medicine as you breathe.  Living with asthma  Staying away from triggers is key in managing asthma. Triggers are specific to the individual and may include such things as allergens, irritants, other health problems, exercise, medicines, and strong emotions. The following can help you limit your exposure:   Allergies    Dust. Dust is the most common year-round allergen. The allergy is caused by tiny dust mites. Dust mites are found in mattresses, carpets, and fabric-covered (upholstered) furniture such as sofas and chairs. They live best in warm, humid conditions. It's important to limit your exposure. Keep your living area as clean as possible by vacuuming and dusting on a weekly basis. Keep the use of carpets to a minimum, and take extra care in the bedroom. Put dust mite covers on your mattress, box spring, and pillows.    Pollens. You may be allergic to pollen. If so, during pollen season keep all car and house windows closed. Use air conditioning. If you have been outside, shower, wash your hair, and change clothes when you go inside.     Pets. Pets that have fur or feathers often cause allergies. If you have pets, try not to touch them. If you do pet or handle them, wash your hands afterward. Keep pets off your furniture, bed, and out of your bedroom. Have someone brush and bathe your pet often.    Mold and mildew.  These can trigger asthma. When outside, stay away from damp, shady areas. Use exhaust fans when cooking or bathing. Keep indoor humidity below 45%. And drain and clean your dehumidifier often.    Exercise  Exercise is a common asthma trigger. But don't limit sports or exercise unless a healthcare provider tells you to. Exercise is good for your health and lungs. Swimming, golf, and karate are good choices if you have asthma. Always warm up before  exercise. And cool down after. Ask your provider about using your quick-relief medicine before starting exercise. Keep a log of what types of exercise trigger asthma problems and talk with your provider about possible ways to manage your symptoms.   Irritants  If you smoke, quit. This is hard to do, but your provider can help provide resources to aid your success.   Stay away from secondhand and thirdhand smoke. Don't let people smoke in your car or in your home.   In addition, stay away from other types of smoke. Don t use wood stoves or kerosene heaters. If you live in an area with air pollution, stay indoors during bad air days and wear a mask if you have to go outside. Also stay away from strong perfumes, cleaning products, fresh paint, and other things with strong odors.   Medicines  Some medicines can make asthma symptoms worse. These medicines include aspirin, NSAIDs (nonsteroidal anti-inflammatory drugs), and beta-blockers. Talk with your provider about your asthma history and medicine use.   Other health problems  Some health problems can make it harder to control asthma. These include:     Respiratory infections such as colds and the flu    GERD (gastroesophageal reflux) and heartburn    Being overweight    Sleep apnea    Depression    Work with your healthcare provider to treat any of these problems.   Strong emotions  The strong feelings that go with laughing and crying can trigger asthma symptoms. You can learn how to better manage your emotions.   Key points about asthma    Asthma is a long-term (chronic) lung disease.    Triggers irritate sensitive airways. This makes it hard to breathe.    Staying away from triggers is an important part of treatment.    Long-term medicines control symptoms. They are taken every day, even when you feel well.    Rescue medicines provide quick symptom relief. But they are short-term.    An Asthma Action Plan can help patients and family members take appropriate, timely  steps to control symptoms.    Next steps  Tips to help you get the most from a visit to your healthcare provider:    Know the reason for your visit and what you want to happen.    Before your visit, write down questions you want answered.    Bring someone with you to help you ask questions and remember what your provider tells you.    At the visit, write down the name of a new diagnosis, and any new medicines, treatments, or tests. Also write down any new instructions your provider gives you.    Know why a new medicine or treatment is prescribed, and how it will help you. Also know what the side effects are.    Ask if your condition can be treated in other ways.    Know why a test or procedure is recommended and what the results could mean.    Know what to expect if you do not take the medicine or have the test or procedure.    If you have a follow-up appointment, write down the date, time, and purpose for that visit.    Know how you can contact your provider if you have questions.  [employer] last reviewed this educational content on 12/1/2020 2000-2021 [employer]. All rights reserved. This information is not intended as a substitute for professional medical care. Always follow your healthcare professional's instructions.         After Your COVID-19 (Coronavirus) Test  You have been tested for COVID-19 (coronavirus).   If you'll have surgery in the next few days, we'll let you know ahead of time if you have the virus. Please call your surgeon's office with any questions.  For all other patients: Results are usually available in [employer] within 2 to 3 days.   If you do not have a [employer] account, you'll get a letter in the mail in about 7 to 10 days.   [employer] is often the fastest way to get test results. Please sign up if you do not already have a [employer] account. See the handout Getting COVID-19 Test Results in [employer] for help.  What if my test result is positive?  If your test is positive and you  "have not viewed your result in [employer], you'll get a phone call with your result. (A positive test means that you have the virus.)     Follow the tips under \"How do I self-isolate?\" below for 10 days (20 days if you have a weak immune system).    You don't need to be retested for COVID-19 before going back to school or work. As long as you're fever-free and feeling better, you can go back to school, work and other activities after waiting the 10 or 20 days.  What if I have questions after I get my results?  If you have questions about your results, please visit our testing website at [email or web address].   After 7 to 10 days, if you have not gotten your results:     Call [phone] ([phone]) and ask to speak with our COVID-19 results team.    If you're being treated at an infusion center: Call your infusion center directly.  What are the symptoms of COVID-19?  Cough, fever and trouble breathing are the most common signs of COVID-19.  Other symptoms can include new headaches, new muscle or body aches, new and unexplained fatigue (feeling very tired), chills, sore throat, congestion (stuffy or runny nose), diarrhea (loose poop), loss of taste or smell, belly pain, and nausea or vomiting (feeling sick to your stomach or throwing up).  You may already have symptoms of COVID-19, or they may show up later.  What should I do if I have symptoms?  If you're having surgery: Call your surgeon's office.  For all other patients: Stay home and away from others (self-isolate) until ...    You've had no fever--and no medicine that reduces fever--for 1 full day (24 hours), AND    Other symptoms have gotten better. For example, your cough or breathing has improved, AND    At least 10 days have passed since your symptoms first started.  How do I self-isolate?  1. Stay in your own room, even for meals. Use your own bathroom if you can.  2. Stay away from others in your home. No hugging, kissing " "or shaking hands. No visitors.  3. Don't go to work, school or anywhere else.  4. Clean \"high touch\" surfaces often (doorknobs, counters, handles). Use household cleaning spray or wipes. You'll find a full list of  on the EPA website: www.epa.gov/pesticide-registration/list-n-disinfectants-use-against-sars-cov-2.  5. Cover your mouth and nose with a mask or other face covering to avoid spreading germs.  6. Wash your hands and face often. Use soap and water.  7. Caregivers in these groups are at risk for severe illness due to COVID-19:  1. People 65 years and older  2. People who live in a nursing home or long-term care facility  3. People with chronic disease (lung, heart, cancer, diabetes, kidney, liver, immunologic)  4. People who have a weakened immune system, including those who:    Are in cancer treatment    Take medicine that weakens the immune system, such as corticosteroids    Had a bone marrow or organ transplant    Have an immune deficiency    Have poorly controlled HIV or AIDS    Are obese (body mass index of 40 or higher)    Smoke regularly  8. Caregivers should wear gloves while washing dishes, handling laundry and cleaning bedrooms and bathrooms.  9. Use caution when washing and drying laundry: Don't shake dirty laundry and use the warmest water setting that you can.  10. For more tips on managing your health at home, go to www.cdc.gov/coronavirus/2019-ncov/downloads/10Things.pdf.  How can I take care of myself at home?  1. Get lots of rest. Drink extra fluids (unless a doctor has told you not to).  2. Take Tylenol (acetaminophen) for fever or pain. If you have liver or kidney problems, ask your family doctor if it's OK to take Tylenol.   Adults can take either:  ? 650 mg (two 325 mg pills) every 4 to 6 hours, or   ? 1,000 mg (two 500 mg pills) every 8 hours as needed.  ? Note: Don't take more than 3,000 mg in one day. Acetaminophen is found in many medicines (both prescribed and " over-the-counter medicines). Read all labels to be sure you don't take too much.   For children, check the Tylenol bottle for the right dose. The dose is based on the child's age or weight.  3. If you have other health problems (like cancer, heart failure, an organ transplant or severe kidney disease): Call your specialty clinic if you don't feel better in the next 2 days.  4. Know when to call 911. Emergency warning signs include:  ? Trouble breathing or shortness of breath  ? Chest pain or pressure that doesn't go away  ? Feeling confused like you haven't felt before, or not being able to wake up  ? Bluish-colored lips or face  5. If your doctor prescribed a blood thinner medicine: Follow their instructions.  Where can I get more information?  1. [facility] - About COVID-19:   [email or web address]  2. CDC - If You're Sick: cdc.gov/coronavirus/2019-ncov/about/steps-when-sick.html  3. CDC - Ending Home Isolation: www.cdc.gov/coronavirus/2019-ncov/hcp/disposition-in-home-patients.html  4. CDC - Caring for Someone: www.cdc.gov/coronavirus/2019-ncov/if-you-are-sick/care-for-someone.html  5. [facility] - Interim Guidance for Hospital Discharge to Home: www.health.[facility].[place]/diseases/coronavirus/hcp/hospdischarge.pdf  6. [facility] clinical trials (COVID-19 research studies): [email or web address]  7. Below are the COVID-19 hotlines at the [facility] ([facility] Interpreters are available.  ? For health questions: Call 141-482-4539 or [phone] (7 a.m. to 7 p.m.)  ? For questions about schools and childcare: Call 011-034-0865 or 1-751.560.5080 (7 a.m. to 7 p.m.)    For informational purposes only. Not to replace the advice of your health care provider. Clinically reviewed by Infection Prevention and the [facility] COVID-19 Clinical Team. Copyright   2020 [facility]. All rights reserved. [employer] 327678 - Rev 11/11/20.

## 2022-03-12 NOTE — PROGRESS NOTES
Chief Complaint   Patient presents with     Urgent Care     sob, needs refil on nebs, HX of asthma          ICD-10-CM    1. Suspected COVID-19 virus infection  Z20.822 Symptomatic; Unknown COVID-19 Virus (Coronavirus) by PCR Nose   2. Mild intermittent asthma with exacerbation  J45.21 predniSONE (DELTASONE) 20 MG tablet   3. Viral URI with cough  J06.9 albuterol (PROVENTIL) (5 MG/ML) 0.5% neb solution   4. Morbid obesity (H)  E66.01      Refill patient's albuterol nebulizers, will give short course of steroids to settle down asthma and wheezing.  She understands if her symptoms worsen she should go to the emergency room.  We discussed the possibility of treatment if her COVID-19 test returns positive.  Encouraged her to consider these options if she is called with a positive result.      Subjective     Megha Salgdao is an 31 year old female who presents to clinic today for ST, headaches, cough, shortness of breath for 3 days. She has been taking Dayquil and Nyquil.  She does have a history of asthma and has been taking her nebulizers frequently with minimal relief.  She has been vaccinated for COVID-19.    ROS: 10 point ROS neg other than the symptoms noted above in the HPI.       Objective    /82   Pulse 101   Temp 99.9  F (37.7  C)   Resp 20   Wt 98.4 kg (217 lb)   SpO2 100%   BMI 36.11 kg/m    Nurses notes and VS have been reviewed.    Physical Exam       GENERAL APPEARANCE: healthy appearing, alert     EYES: PERRL, EOMI, sclera non-icteric     HENT: oral exam benign, mucus membranes intact, without ulcers or lesions     NECK: no adenopathy or asymmetry, thyroid normal to palpation     RESP: Few expiratory wheezes otherwise clear     CV: regular rates and rhythm, no murmurs, rubs, or gallop     MS: extremities normal- no gross deformities noted; normal muscle tone.     SKIN: no suspicious lesions or rashes     NEURO: Normal strength and tone, mentation intact and speech normal     PSYCH: normal  thought process; no significant mood disturbance    Patient Instructions       Patient Education     Asthma  What is asthma?  Asthma is a long-term (chronic) ?lung disease. The airways react to triggers (allergens and irritants). This makes it hard to breathe. With exposure to triggers, changes can occur such as:     The airways become swollen and inflamed.    The muscles around the airways tighten.    More mucus is made. This leads to mucus plugs.  All of these changes make the airways narrow. This makes it hard for air to go out of the lungs. And fresh oxygen can't get into the body.   What causes asthma?  Experts don't know the exact cause of asthma. They believe it is partly inherited. The environment, infections, and chemicals released by the body also play a role.   Exercise causes symptoms in many people with asthma. Symptoms can occur during exercise. They can also occur right after exercise. In some people, stress or strong feelings can cause symptoms.   All of these may be asthma triggers:   Allergens Respiratory problem         Pollens (trees, grasses, and weeds)    Mold    Pets    Dust and dust mites    Cockroaches    Mice       Nasal allergies    Sinus infections    The flu    Viral infections, including the common cold   Irritants Medicines         Strong odors from perfumes, , cooking, paints, and varnishes    Chemicals (gases, fumes)    Air pollution    Changing weather (temperature, barometric pressure, humidity, and strong winds)    Smoke (tobacco-inhaled or secondhand)       Aspirin    NSAIDs (nonsteroidal anti-inflammatory drugs) such as ibuprofen   Other conditions Other         GERD (gastroesophageal reflux)    Sleep apnea    Overweight    Depression       Exercise, especially in cold weather    Strong feelings that go along with laughing or crying       Who is at risk for asthma?  It is most common in:     Children and teens ages 5 to17    People living in cities  Other factors  include:    Personal or family history of asthma or allergies    Exposure to secondhand tobacco smoke    Children with a family history of asthma    Children who have allergies or atopic dermatitis    Children exposed to secondhand and tobacco smoke    Living in areas with high levels of environmental air pollution  What are the symptoms of asthma?  Symptoms include:    Trouble breathing or shortness of breath    Chest tightness    Wheezing or a whistling sound when breathing    Coughing    Breathing becomes harder and may hurt    Talking and sleeping may be harder with severe symptoms  How is asthma diagnosed?  Your healthcare provider will ask about your health history. They will give you a physical exam. You will also have other tests. An important test is spirometry.   A spirometer is a device used to find out how well the lungs are working. It measures the amount and speed of air breathed out.   You may have other tests. These are done to check for conditions such as allergies.   How is asthma treated?  Treatment will depend on your symptoms, age, and general health. It will also depend on how severe the condition is.   There is no cure for asthma. It can often be controlled by staying away from triggers. And by taking medicines as prescribed by your healthcare provider.   Watching for symptoms and taking early, appropriate action is a key part of asthma care. So is knowing what to do if symptoms get worse. Experts advise making an Asthma Action Plan with your provider and educating your family and close friends about its purpose and content. This will help them provide correct asthma care if you are ill and can't care for yourself.   Medicines for asthma  The 2 types of asthma medicines are long-term control and short-term (quick-relief) medicines. Long-term control medicines are often taken every day. They help prevent symptoms. Quick-relief medicines calm asthma symptoms fast. But they only last for a short  time. You may take either type of medicine alone. Some people take both.   Your healthcare provider should regularly check and adjust your medicines as needed.   Long-term control medicines  At first, it may take a few weeks for long-term control medicines to work. You must take these medicines every day. These medicines include:     Anti-inflammatory medicines. These medicines reduce or prevent airway swelling.    Bronchodilators. These relax muscles around the airways.    Leukotriene modifiers. These block the action of chemicals called leukotrienes. These are chemicals that cause airways to be inflamed and narrowed.     Biologic therapy. For people with asthma that isn't well controlled despite inhaler therapy, there are some newer medicines. These target the inflammatory cells in the body that start the asthma reaction. These medicines include anti-IL4, Anti-IL5, and anti-IgE medicines. They are often given by shot (injection) or infusion.  Quick-relief medicines  Quick-relief medicines quickly relax the muscles around the airways. But the relief only lasts about 2 to 3 hours.   These medicines may include:    Inhaled short-acting beta2-agonists .  These help relax muscles around the airways.    Inhaled anticholinergics . These block a chemical in the body called acetylcholine. This chemical contracts the muscles. It also causes more mucus in the airways.  Inhalation devices for asthma  Inhaled medicines go right to the lungs. They have fewer side effects than medicines taken by mouth. Inhaled medicines may be anti-inflammatory or bronchodilating. Or they may be both. The devices used are:     Metered-dose inhaler (MDI). This is the most common type of inhaler. It uses a chemical to push the medicine out of the inhaler. MDIs are held in front of or put into the mouth. Then the medicine is released in puffs. Or they may be used with a spacer device.    Nebulizer. This device sprays a fine mist of medicine. This  is done through a mask using air under pressure, or an ultrasonic machine. A mouthpiece or mask is connected to a machine by plastic tubing to deliver medicine.    Dry powder or rotary inhaler.  These inhalers deliver powered medicine as you breathe.  Living with asthma  Staying away from triggers is key in managing asthma. Triggers are specific to the individual and may include such things as allergens, irritants, other health problems, exercise, medicines, and strong emotions. The following can help you limit your exposure:   Allergies    Dust. Dust is the most common year-round allergen. The allergy is caused by tiny dust mites. Dust mites are found in mattresses, carpets, and fabric-covered (upholstered) furniture such as sofas and chairs. They live best in warm, humid conditions. It's important to limit your exposure. Keep your living area as clean as possible by vacuuming and dusting on a weekly basis. Keep the use of carpets to a minimum, and take extra care in the bedroom. Put dust mite covers on your mattress, box spring, and pillows.    Pollens. You may be allergic to pollen. If so, during pollen season keep all car and house windows closed. Use air conditioning. If you have been outside, shower, wash your hair, and change clothes when you go inside.     Pets. Pets that have fur or feathers often cause allergies. If you have pets, try not to touch them. If you do pet or handle them, wash your hands afterward. Keep pets off your furniture, bed, and out of your bedroom. Have someone brush and bathe your pet often.    Mold and mildew.  These can trigger asthma. When outside, stay away from damp, shady areas. Use exhaust fans when cooking or bathing. Keep indoor humidity below 45%. And drain and clean your dehumidifier often.    Exercise  Exercise is a common asthma trigger. But don't limit sports or exercise unless a healthcare provider tells you to. Exercise is good for your health and lungs. Swimming, golf,  and karate are good choices if you have asthma. Always warm up before exercise. And cool down after. Ask your provider about using your quick-relief medicine before starting exercise. Keep a log of what types of exercise trigger asthma problems and talk with your provider about possible ways to manage your symptoms.   Irritants  If you smoke, quit. This is hard to do, but your provider can help provide resources to aid your success.   Stay away from secondhand and thirdhand smoke. Don't let people smoke in your car or in your home.   In addition, stay away from other types of smoke. Don t use wood stoves or kerosene heaters. If you live in an area with air pollution, stay indoors during bad air days and wear a mask if you have to go outside. Also stay away from strong perfumes, cleaning products, fresh paint, and other things with strong odors.   Medicines  Some medicines can make asthma symptoms worse. These medicines include aspirin, NSAIDs (nonsteroidal anti-inflammatory drugs), and beta-blockers. Talk with your provider about your asthma history and medicine use.   Other health problems  Some health problems can make it harder to control asthma. These include:     Respiratory infections such as colds and the flu    GERD (gastroesophageal reflux) and heartburn    Being overweight    Sleep apnea    Depression    Work with your healthcare provider to treat any of these problems.   Strong emotions  The strong feelings that go with laughing and crying can trigger asthma symptoms. You can learn how to better manage your emotions.   Key points about asthma    Asthma is a long-term (chronic) lung disease.    Triggers irritate sensitive airways. This makes it hard to breathe.    Staying away from triggers is an important part of treatment.    Long-term medicines control symptoms. They are taken every day, even when you feel well.    Rescue medicines provide quick symptom relief. But they are short-term.    An Asthma  Action Plan can help patients and family members take appropriate, timely steps to control symptoms.    Next steps  Tips to help you get the most from a visit to your healthcare provider:    Know the reason for your visit and what you want to happen.    Before your visit, write down questions you want answered.    Bring someone with you to help you ask questions and remember what your provider tells you.    At the visit, write down the name of a new diagnosis, and any new medicines, treatments, or tests. Also write down any new instructions your provider gives you.    Know why a new medicine or treatment is prescribed, and how it will help you. Also know what the side effects are.    Ask if your condition can be treated in other ways.    Know why a test or procedure is recommended and what the results could mean.    Know what to expect if you do not take the medicine or have the test or procedure.    If you have a follow-up appointment, write down the date, time, and purpose for that visit.    Know how you can contact your provider if you have questions.  [employer] last reviewed this educational content on 12/1/2020 2000-2021 [employer]. All rights reserved. This information is not intended as a substitute for professional medical care. Always follow your healthcare professional's instructions.         After Your COVID-19 (Coronavirus) Test  You have been tested for COVID-19 (coronavirus).   If you'll have surgery in the next few days, we'll let you know ahead of time if you have the virus. Please call your surgeon's office with any questions.  For all other patients: Results are usually available in [facility] within 2 to 3 days.   If you do not have a [employer] account, you'll get a letter in the mail in about 7 to 10 days.   [facility] is often the fastest way to get test results. Please sign up if you do not already have a [employer] account. See the handout Getting COVID-19 Test Results in [employer] for  "help.  What if my test result is positive?  If your test is positive and you have not viewed your result in [facility], you'll get a phone call with your result. (A positive test means that you have the virus.)     Follow the tips under \"How do I self-isolate?\" below for 10 days (20 days if you have a weak immune system).    You don't need to be retested for COVID-19 before going back to school or work. As long as you're fever-free and feeling better, you can go back to school, work and other activities after waiting the 10 or 20 days.  What if I have questions after I get my results?  If you have questions about your results, please visit our testing website at [email or web address].   After 7 to 10 days, if you have not gotten your results:     Call [phone] ([phone]) and ask to speak with our COVID-19 results team.    If you're being treated at an infusion center: Call your infusion center directly.  What are the symptoms of COVID-19?  Cough, fever and trouble breathing are the most common signs of COVID-19.  Other symptoms can include new headaches, new muscle or body aches, new and unexplained fatigue (feeling very tired), chills, sore throat, congestion (stuffy or runny nose), diarrhea (loose poop), loss of taste or smell, belly pain, and nausea or vomiting (feeling sick to your stomach or throwing up).  You may already have symptoms of COVID-19, or they may show up later.  What should I do if I have symptoms?  If you're having surgery: Call your surgeon's office.  For all other patients: Stay home and away from others (self-isolate) until ...    You've had no fever--and no medicine that reduces fever--for 1 full day (24 hours), AND    Other symptoms have gotten better. For example, your cough or breathing has improved, AND    At least 10 days have passed since your symptoms first started.  How do I self-isolate?  1. Stay in your own room, even for meals. Use your own " "bathroom if you can.  2. Stay away from others in your home. No hugging, kissing or shaking hands. No visitors.  3. Don't go to work, school or anywhere else.  4. Clean \"high touch\" surfaces often (doorknobs, counters, handles). Use household cleaning spray or wipes. You'll find a full list of  on the EPA website: www.epa.gov/pesticide-registration/list-n-disinfectants-use-against-sars-cov-2.  5. Cover your mouth and nose with a mask or other face covering to avoid spreading germs.  6. Wash your hands and face often. Use soap and water.  7. Caregivers in these groups are at risk for severe illness due to COVID-19:  1. People 65 years and older  2. People who live in a nursing home or long-term care facility  3. People with chronic disease (lung, heart, cancer, diabetes, kidney, liver, immunologic)  4. People who have a weakened immune system, including those who:    Are in cancer treatment    Take medicine that weakens the immune system, such as corticosteroids    Had a bone marrow or organ transplant    Have an immune deficiency    Have poorly controlled HIV or AIDS    Are obese (body mass index of 40 or higher)    Smoke regularly  8. Caregivers should wear gloves while washing dishes, handling laundry and cleaning bedrooms and bathrooms.  9. Use caution when washing and drying laundry: Don't shake dirty laundry and use the warmest water setting that you can.  10. For more tips on managing your health at home, go to www.cdc.gov/coronavirus/2019-ncov/downloads/10Things.pdf.  How can I take care of myself at home?  1. Get lots of rest. Drink extra fluids (unless a doctor has told you not to).  2. Take Tylenol (acetaminophen) for fever or pain. If you have liver or kidney problems, ask your family doctor if it's OK to take Tylenol.   Adults can take either:  ? 650 mg (two 325 mg pills) every 4 to 6 hours, or   ? 1,000 mg (two 500 mg pills) every 8 hours as needed.  ? Note: Don't take more than 3,000 mg in one " day. Acetaminophen is found in many medicines (both prescribed and over-the-counter medicines). Read all labels to be sure you don't take too much.   For children, check the Tylenol bottle for the right dose. The dose is based on the child's age or weight.  3. If you have other health problems (like cancer, heart failure, an organ transplant or severe kidney disease): Call your specialty clinic if you don't feel better in the next 2 days.  4. Know when to call 911. Emergency warning signs include:  ? Trouble breathing or shortness of breath  ? Chest pain or pressure that doesn't go away  ? Feeling confused like you haven't felt before, or not being able to wake up  ? Bluish-colored lips or face  5. If your doctor prescribed a blood thinner medicine: Follow their instructions.  Where can I get more information?  1. [facility] - About COVID-19:   [email or web address]  2. CDC - If You're Sick: cdc.gov/coronavirus/2019-ncov/about/steps-when-sick.html  3. CDC - Ending Home Isolation: www.cdc.gov/coronavirus/2019-ncov/hcp/disposition-in-home-patients.html  4. CDC - Caring for Someone: www.cdc.gov/coronavirus/2019-ncov/if-you-are-sick/care-for-someone.html  5. [facility] - Interim Guidance for Hospital Discharge to Home: www.health.[facility].[place]/diseases/coronavirus/hcp/hospdischarge.pdf  6. [facility] clinical trials (COVID-19 research studies): [email or web address]-clinical-trials  7. Below are the COVID-19 hotlines at the [place] Department of Health ([facility]). Interpreters are available.  ? For health questions: Call 551-984-9037 or [phone] (7 a.m. to 7 p.m.)  ? For questions about schools and childcare: Call 021-035-0989 or 1-718.222.1100 (7 a.m. to 7 p.m.)    For informational purposes only. Not to replace the advice of your health care provider. Clinically reviewed by Infection Prevention and the [facility] COVID-19 Clinical Team. Copyright   2020 [facility].  All rights reserved. [employer] 729693 - Rev 11/11/20.             LULÚ Cohen, CNP  [facility] Provider    The use of Dragon/[employer] dictation services may have been used to construct the content in this note; any grammatical or spelling errors are non-intentional. Please contact the author of this note directly if you are in need of any clarification.

## 2022-04-30 ENCOUNTER — OFFICE VISIT (OUTPATIENT)
Dept: URGENT CARE | Facility: URGENT CARE | Age: 32
End: 2022-04-30
Payer: COMMERCIAL

## 2022-04-30 VITALS
RESPIRATION RATE: 20 BRPM | SYSTOLIC BLOOD PRESSURE: 122 MMHG | WEIGHT: 226 LBS | HEART RATE: 85 BPM | OXYGEN SATURATION: 100 % | DIASTOLIC BLOOD PRESSURE: 62 MMHG | BODY MASS INDEX: 37.61 KG/M2 | TEMPERATURE: 97.9 F

## 2022-04-30 DIAGNOSIS — R10.30 LOWER ABDOMINAL PAIN: Primary | ICD-10-CM

## 2022-04-30 DIAGNOSIS — K59.00 CONSTIPATION, UNSPECIFIED CONSTIPATION TYPE: ICD-10-CM

## 2022-04-30 LAB
ALBUMIN SERPL-MCNC: 3.1 G/DL (ref 3.4–5)
ALBUMIN UR-MCNC: NEGATIVE MG/DL
ALP SERPL-CCNC: 64 U/L (ref 40–150)
ALT SERPL W P-5'-P-CCNC: 14 U/L (ref 0–50)
ANION GAP SERPL CALCULATED.3IONS-SCNC: 5 MMOL/L (ref 3–14)
APPEARANCE UR: CLEAR
AST SERPL W P-5'-P-CCNC: 13 U/L (ref 0–45)
BASOPHILS # BLD AUTO: 0 10E3/UL (ref 0–0.2)
BASOPHILS NFR BLD AUTO: 0 %
BILIRUB SERPL-MCNC: 0.2 MG/DL (ref 0.2–1.3)
BILIRUB UR QL STRIP: NEGATIVE
BUN SERPL-MCNC: 10 MG/DL (ref 7–30)
CALCIUM SERPL-MCNC: 9.1 MG/DL (ref 8.5–10.1)
CHLORIDE BLD-SCNC: 105 MMOL/L (ref 94–109)
CO2 SERPL-SCNC: 25 MMOL/L (ref 20–32)
COLOR UR AUTO: YELLOW
CREAT SERPL-MCNC: 0.76 MG/DL (ref 0.52–1.04)
EOSINOPHIL # BLD AUTO: 0.2 10E3/UL (ref 0–0.7)
EOSINOPHIL NFR BLD AUTO: 2 %
ERYTHROCYTE [DISTWIDTH] IN BLOOD BY AUTOMATED COUNT: 14.2 % (ref 10–15)
GFR SERPL CREATININE-BSD FRML MDRD: >90 ML/MIN/1.73M2
GLUCOSE BLD-MCNC: 92 MG/DL (ref 70–99)
GLUCOSE UR STRIP-MCNC: NEGATIVE MG/DL
HCT VFR BLD AUTO: 35.7 % (ref 35–47)
HGB BLD-MCNC: 11.2 G/DL (ref 11.7–15.7)
HGB UR QL STRIP: NEGATIVE
KETONES UR STRIP-MCNC: NEGATIVE MG/DL
LEUKOCYTE ESTERASE UR QL STRIP: ABNORMAL
LYMPHOCYTES # BLD AUTO: 1.9 10E3/UL (ref 0.8–5.3)
LYMPHOCYTES NFR BLD AUTO: 28 %
MCH RBC QN AUTO: 25.6 PG (ref 26.5–33)
MCHC RBC AUTO-ENTMCNC: 31.4 G/DL (ref 31.5–36.5)
MCV RBC AUTO: 82 FL (ref 78–100)
MONOCYTES # BLD AUTO: 0.6 10E3/UL (ref 0–1.3)
MONOCYTES NFR BLD AUTO: 8 %
NEUTROPHILS # BLD AUTO: 4.3 10E3/UL (ref 1.6–8.3)
NEUTROPHILS NFR BLD AUTO: 62 %
NITRATE UR QL: NEGATIVE
PH UR STRIP: 6 [PH] (ref 5–7)
PLATELET # BLD AUTO: 334 10E3/UL (ref 150–450)
POTASSIUM BLD-SCNC: 3.9 MMOL/L (ref 3.4–5.3)
PROT SERPL-MCNC: 7.9 G/DL (ref 6.8–8.8)
RBC # BLD AUTO: 4.37 10E6/UL (ref 3.8–5.2)
RBC #/AREA URNS AUTO: NORMAL /HPF
SODIUM SERPL-SCNC: 135 MMOL/L (ref 133–144)
SP GR UR STRIP: 1.02 (ref 1–1.03)
UROBILINOGEN UR STRIP-ACNC: 0.2 E.U./DL
WBC # BLD AUTO: 7 10E3/UL (ref 4–11)
WBC #/AREA URNS AUTO: NORMAL /HPF

## 2022-04-30 PROCEDURE — 36415 COLL VENOUS BLD VENIPUNCTURE: CPT | Performed by: FAMILY MEDICINE

## 2022-04-30 PROCEDURE — 85025 COMPLETE CBC W/AUTO DIFF WBC: CPT | Performed by: FAMILY MEDICINE

## 2022-04-30 PROCEDURE — 99213 OFFICE O/P EST LOW 20 MIN: CPT | Performed by: FAMILY MEDICINE

## 2022-04-30 PROCEDURE — 81001 URINALYSIS AUTO W/SCOPE: CPT | Performed by: FAMILY MEDICINE

## 2022-04-30 PROCEDURE — 80053 COMPREHEN METABOLIC PANEL: CPT | Performed by: FAMILY MEDICINE

## 2022-05-01 NOTE — PATIENT INSTRUCTIONS
Increase fiber intake and metamucil    Increase fluid intake    Increase walking    Follow up with 1st ob appointment this week    Be seen sooner if needed

## 2022-05-01 NOTE — PROGRESS NOTES
Megha Salgado is a 32 year old female who comes in today for bad stomach pain for about 3 weeks    Constant but some days worse than others    Nausea, constipation, cramps, shooting pain    Sometimes up toward chest    Sometimes moving / going over a bump is worse    Sitting still crunched up helps the most    9 weeks pregnant     Not worse with eating    Not drinking enough liquids    Urinating a good amount     Bowels okay    No vaginal problems    No vomiting    No problems during pregnancy    No vaginal discharge    1st ob appointment coming up Wednesday    [facility]    No meds currently    Most nights sleep okay      Maybe anemic at some point in past?    ROS    Physical Exam  Constitutional:       Appearance: Normal appearance.   HENT:      Head: Normocephalic and atraumatic.   Eyes:      Conjunctiva/sclera: Conjunctivae normal.   Cardiovascular:      Rate and Rhythm: Normal rate and regular rhythm.      Heart sounds: Normal heart sounds.   Pulmonary:      Effort: Pulmonary effort is normal. No respiratory distress.      Breath sounds: Normal breath sounds.   Neurological:      Mental Status: She is alert.       abd soft, slight subjective tenderness in lower abd  No upper abd tenderness, neg  French sign    No  Point tenderness at mcburneys point    No peritoneal signs at all    No back/ costovertebral angle tenderness     No flank  / rib tenderness    Back range of motion okay here, does not cause  Pain when  Patient moves    ASSESSMENT / PLAN:  (R10.30) Lower abdominal pain  (primary encounter diagnosis)  Comment: labs here are reassuring.  Slightly anemic but similar to a  Couple years ago.    Plan: UA with Microscopic reflex to Culture - lab         collect, CBC with Platelets & Differential,         Comprehensive metabolic panel, Urine         Microscopic             (K59.00) Constipation, unspecified constipation type  Comment: patient states it feels like she is not emptying completely of the  bowels.  Advised fiber, dietary and supplemental, increase walking, and increase fluid intake ( urine quite concentrated ).  Plan: as above    Patient has  The  1st ob appointment  Middle of next week  I printed lab results for her to take to that  Appointment    Be seen sooner if needed     Some off this could be round  Ligament type pain also?      I reviewed the patient's medications, allergies, medical history, family history, and social history.    Eusebio Daley MD